# Patient Record
Sex: FEMALE | Race: WHITE | NOT HISPANIC OR LATINO | Employment: OTHER | URBAN - METROPOLITAN AREA
[De-identification: names, ages, dates, MRNs, and addresses within clinical notes are randomized per-mention and may not be internally consistent; named-entity substitution may affect disease eponyms.]

---

## 2023-04-06 ENCOUNTER — OFFICE VISIT (OUTPATIENT)
Dept: FAMILY MEDICINE CLINIC | Facility: CLINIC | Age: 38
End: 2023-04-06
Payer: COMMERCIAL

## 2023-04-06 VITALS
SYSTOLIC BLOOD PRESSURE: 102 MMHG | TEMPERATURE: 97.3 F | OXYGEN SATURATION: 100 % | DIASTOLIC BLOOD PRESSURE: 65 MMHG | HEIGHT: 65 IN | HEART RATE: 79 BPM | WEIGHT: 169.8 LBS | BODY MASS INDEX: 28.29 KG/M2

## 2023-04-06 DIAGNOSIS — E66.3 OVERWEIGHT (BMI 25.0-29.9): ICD-10-CM

## 2023-04-06 DIAGNOSIS — Z00.01 ENCOUNTER FOR GENERAL ADULT MEDICAL EXAMINATION WITH ABNORMAL FINDINGS: Primary | ICD-10-CM

## 2023-04-06 DIAGNOSIS — Z13.220 LIPID SCREENING: ICD-10-CM

## 2023-04-06 DIAGNOSIS — N92.0 MENORRHAGIA WITH REGULAR CYCLE: ICD-10-CM

## 2023-04-06 PROBLEM — Z00.00 ENCOUNTER FOR HEALTH MAINTENANCE EXAMINATION: Status: ACTIVE | Noted: 2020-12-08

## 2023-04-06 PROBLEM — L40.9 PSORIASIS: Status: ACTIVE | Noted: 2018-11-20

## 2023-04-06 RX ORDER — ASCORBIC ACID 250 MG
250 TABLET ORAL DAILY
COMMUNITY

## 2023-04-06 NOTE — PROGRESS NOTES
Subjective   Love Joya is a 37 y.o. female who presents for annual female wellness exam.  Chief Complaint   Patient presents with   • Establish Care   • Annual Exam     Patient is here today to establish care as a new patient.  She was previous with Madison Rodriguez with [facility].  Has not seen her for a while.     Has no new complaints today. Just wanted to get established and get check up. Hasn't had in years     Menstrual History: regular, heavy, had mirena removed a few months ago  Pregnancy History:   Sexual History: 1 male partner  Contraception: partners vasecomtomy  Hormone Replacement Therapy:n/a  Diet: making healthier choices. Drinks mostly water and coffee  Exercise:  Walking since nice weather.   Do you feel safe? Reports she does  Have you ever been abused? Denies  Dentist: twice yearly  Eye doctor: been a while    Mammogram: n/a  Pap Smear:   Bone Density: n/a  Colon Cancer Screening: n/a    Immunization History   Administered Date(s) Administered   • 31-influenza Vac Quardvalent Preservativ 11/20/2017   • Flu Vaccine Quad PF 6-35MO 10/16/2018   • FluLaval/Fluzone >6mos 11/10/2014, 10/24/2019, 10/07/2020   • Tdap 06/13/2013, 06/29/2018       The following portions of the patient's history were reviewed and updated as appropriate: allergies, current medications, past family history, past medical history, past social history, past surgical history and problem list.    History reviewed. No pertinent past medical history.    History reviewed. No pertinent surgical history.    Family History   Problem Relation Age of Onset   • Lung cancer Paternal Grandmother    • Hypothyroidism Paternal Grandmother    • Colon cancer Paternal Grandfather 64       Social History     Socioeconomic History   • Marital status:    Tobacco Use   • Smoking status: Never   Vaping Use   • Vaping Use: Never used   Substance and Sexual Activity   • Alcohol use: Yes     Comment: socially   • Drug use: Never   • Sexual activity:  Yes     Partners: Male     Birth control/protection: Vasectomy       Review of Systems    Objective   Vitals:    04/06/23 0802   BP: 102/65   Pulse: 79   Temp: 97.3 °F (36.3 °C)   SpO2: 100%     Body mass index is 28.26 kg/m².  Physical Exam  Constitutional:       Appearance: Normal appearance.   Cardiovascular:      Rate and Rhythm: Normal rate and regular rhythm.      Pulses: Normal pulses.   Pulmonary:      Effort: Pulmonary effort is normal.      Breath sounds: Normal breath sounds.   Skin:     General: Skin is warm and dry.   Neurological:      Mental Status: She is alert and oriented to person, place, and time.   Psychiatric:         Mood and Affect: Mood normal.         Behavior: Behavior normal.         Thought Content: Thought content normal.         Judgment: Judgment normal.           Assessment & Plan   Diagnoses and all orders for this visit:    1. Encounter for general adult medical examination with abnormal findings (Primary)    2. Lipid screening  -     Lipid Panel    3. Overweight (BMI 25.0-29.9)  -     CBC & Differential  -     Comprehensive Metabolic Panel  -     Lipid Panel  -     TSH    4. Menorrhagia with regular cycle  -     CBC & Differential  -     Vitamin B12        Physical complete for patient.  We discussed the need for weight loss to healthy BMI between 18 and 25.  This can be achieved with reduction of calories and carbs in diet and inclusion of exercise.  We will check labs today and call with results any changes needed plan of care.  Follow-up yearly and sooner as needed.  She verbalized understanding and is agreeable.         Discussed the importance of maintaining a healthy weight and getting regular exercise.  Educated patient on the benefits of healthy diet.  Advise follow-up annually for wellness exams.

## 2023-04-07 LAB
ALBUMIN SERPL-MCNC: 4.5 G/DL (ref 3.5–5.2)
ALBUMIN/GLOB SERPL: 2.3 G/DL
ALP SERPL-CCNC: 46 U/L (ref 39–117)
ALT SERPL-CCNC: 12 U/L (ref 1–33)
AST SERPL-CCNC: 15 U/L (ref 1–32)
BASOPHILS # BLD AUTO: 0.06 10*3/MM3 (ref 0–0.2)
BASOPHILS NFR BLD AUTO: 0.9 % (ref 0–1.5)
BILIRUB SERPL-MCNC: 0.3 MG/DL (ref 0–1.2)
BUN SERPL-MCNC: 17 MG/DL (ref 6–20)
BUN/CREAT SERPL: 23.3 (ref 7–25)
CALCIUM SERPL-MCNC: 9.5 MG/DL (ref 8.6–10.5)
CHLORIDE SERPL-SCNC: 104 MMOL/L (ref 98–107)
CHOLEST SERPL-MCNC: 187 MG/DL (ref 0–200)
CO2 SERPL-SCNC: 26.9 MMOL/L (ref 22–29)
CREAT SERPL-MCNC: 0.73 MG/DL (ref 0.57–1)
EGFRCR SERPLBLD CKD-EPI 2021: 109.5 ML/MIN/1.73
EOSINOPHIL # BLD AUTO: 0.17 10*3/MM3 (ref 0–0.4)
EOSINOPHIL NFR BLD AUTO: 2.4 % (ref 0.3–6.2)
ERYTHROCYTE [DISTWIDTH] IN BLOOD BY AUTOMATED COUNT: 11.5 % (ref 12.3–15.4)
GLOBULIN SER CALC-MCNC: 2 GM/DL
GLUCOSE SERPL-MCNC: 94 MG/DL (ref 65–99)
HCT VFR BLD AUTO: 33 % (ref 34–46.6)
HDLC SERPL-MCNC: 52 MG/DL (ref 40–60)
HGB BLD-MCNC: 10.9 G/DL (ref 12–15.9)
IMM GRANULOCYTES # BLD AUTO: 0.02 10*3/MM3 (ref 0–0.05)
IMM GRANULOCYTES NFR BLD AUTO: 0.3 % (ref 0–0.5)
LDLC SERPL CALC-MCNC: 122 MG/DL (ref 0–100)
LYMPHOCYTES # BLD AUTO: 2.26 10*3/MM3 (ref 0.7–3.1)
LYMPHOCYTES NFR BLD AUTO: 32.6 % (ref 19.6–45.3)
MCH RBC QN AUTO: 32.2 PG (ref 26.6–33)
MCHC RBC AUTO-ENTMCNC: 33 G/DL (ref 31.5–35.7)
MCV RBC AUTO: 97.6 FL (ref 79–97)
MONOCYTES # BLD AUTO: 0.38 10*3/MM3 (ref 0.1–0.9)
MONOCYTES NFR BLD AUTO: 5.5 % (ref 5–12)
NEUTROPHILS # BLD AUTO: 4.05 10*3/MM3 (ref 1.7–7)
NEUTROPHILS NFR BLD AUTO: 58.3 % (ref 42.7–76)
NRBC BLD AUTO-RTO: 0 /100 WBC (ref 0–0.2)
PLATELET # BLD AUTO: 267 10*3/MM3 (ref 140–450)
POTASSIUM SERPL-SCNC: 4.3 MMOL/L (ref 3.5–5.2)
PROT SERPL-MCNC: 6.5 G/DL (ref 6–8.5)
RBC # BLD AUTO: 3.38 10*6/MM3 (ref 3.77–5.28)
SODIUM SERPL-SCNC: 137 MMOL/L (ref 136–145)
TRIGL SERPL-MCNC: 72 MG/DL (ref 0–150)
TSH SERPL DL<=0.005 MIU/L-ACNC: 1.58 UIU/ML (ref 0.27–4.2)
VIT B12 SERPL-MCNC: 999 PG/ML (ref 211–946)
VLDLC SERPL CALC-MCNC: 13 MG/DL (ref 5–40)
WBC # BLD AUTO: 6.94 10*3/MM3 (ref 3.4–10.8)

## 2023-04-10 LAB
FERRITIN SERPL-MCNC: 121 NG/ML (ref 13–150)
IRON SATN MFR SERPL: 16 % (ref 20–50)
IRON SERPL-MCNC: 52 MCG/DL (ref 37–145)
TIBC SERPL-MCNC: 332 MCG/DL
UIBC SERPL-MCNC: 280 MCG/DL (ref 112–346)
WRITTEN AUTHORIZATION: NORMAL

## 2023-04-12 RX ORDER — FERROUS SULFATE TAB EC 324 MG (65 MG FE EQUIVALENT) 324 (65 FE) MG
324 TABLET DELAYED RESPONSE ORAL
Qty: 90 TABLET | Refills: 1 | Status: SHIPPED | OUTPATIENT
Start: 2023-04-12

## 2023-04-12 NOTE — PROGRESS NOTES
Please call patient with results of labs.  Patient is anemic and iron panel is slightly low.  I would recommend she start iron supplement.  I am going to send in.  LDL or bad cholesterol is slightly elevated.  She should decrease saturated fats, fried foods, and fast foods in diet.  All other labs are stable.  We should recheck CBC and iron profile in 3 months.

## 2023-11-28 ENCOUNTER — OFFICE VISIT (OUTPATIENT)
Dept: FAMILY MEDICINE CLINIC | Facility: CLINIC | Age: 38
End: 2023-11-28
Payer: COMMERCIAL

## 2023-11-28 VITALS
HEIGHT: 65 IN | BODY MASS INDEX: 28.82 KG/M2 | SYSTOLIC BLOOD PRESSURE: 126 MMHG | OXYGEN SATURATION: 98 % | HEART RATE: 98 BPM | WEIGHT: 173 LBS | DIASTOLIC BLOOD PRESSURE: 78 MMHG

## 2023-11-28 DIAGNOSIS — R05.9 COUGH, UNSPECIFIED TYPE: ICD-10-CM

## 2023-11-28 DIAGNOSIS — J10.1 INFLUENZA A: Primary | ICD-10-CM

## 2023-11-28 LAB
EXPIRATION DATE: ABNORMAL
EXPIRATION DATE: NORMAL
FLUAV AG NPH QL: POSITIVE
FLUBV AG NPH QL: NEGATIVE
INTERNAL CONTROL: ABNORMAL
INTERNAL CONTROL: NORMAL
Lab: ABNORMAL
Lab: NORMAL
SARS-COV-2 AG UPPER RESP QL IA.RAPID: NOT DETECTED

## 2023-11-28 PROCEDURE — 99213 OFFICE O/P EST LOW 20 MIN: CPT | Performed by: FAMILY MEDICINE

## 2023-11-28 PROCEDURE — 87426 SARSCOV CORONAVIRUS AG IA: CPT | Performed by: FAMILY MEDICINE

## 2023-11-28 RX ORDER — BENZONATATE 200 MG/1
200 CAPSULE ORAL 3 TIMES DAILY PRN
Qty: 30 CAPSULE | Refills: 0 | Status: SHIPPED | OUTPATIENT
Start: 2023-11-28

## 2023-11-28 RX ORDER — DEXTROMETHORPHAN HYDROBROMIDE AND PROMETHAZINE HYDROCHLORIDE 15; 6.25 MG/5ML; MG/5ML
5 SYRUP ORAL NIGHTLY PRN
Qty: 118 ML | Refills: 0 | Status: SHIPPED | OUTPATIENT
Start: 2023-11-28

## 2023-11-28 NOTE — PROGRESS NOTES
"Chief Complaint  Cough (Since Saturday ) and URI    Subjective        Love Joya presents to [facility]  History of Present Illness  Patient is here today to discuss some new symptoms she is having.  For the past 3 days the patient has had cough and sinus congestion.  Son had Strep 2 weeks ago but no other ill contacts.  No fever or chills.  The patient denies a history of pulmonary diseases.  She states that this morning she also felt chest pain with cough but that has improved.  Patient denies any difficulty breathing.      Objective   Vital Signs:  /78   Pulse 98   Ht 165.1 cm (65\")   Wt 78.5 kg (173 lb)   SpO2 98%   BMI 28.79 kg/m²   Estimated body mass index is 28.79 kg/m² as calculated from the following:    Height as of this encounter: 165.1 cm (65\").    Weight as of this encounter: 78.5 kg (173 lb).             Physical Exam  Vitals and nursing note reviewed.   Constitutional:       Appearance: Normal appearance. She is well-developed.   HENT:      Head: Normocephalic and atraumatic.      Right Ear: Tympanic membrane, ear canal and external ear normal.      Left Ear: Tympanic membrane, ear canal and external ear normal.      Nose: Rhinorrhea present.      Mouth/Throat:      Pharynx: Posterior oropharyngeal erythema present.   Eyes:      General: No scleral icterus.     Conjunctiva/sclera: Conjunctivae normal.   Cardiovascular:      Rate and Rhythm: Normal rate and regular rhythm.      Heart sounds: Normal heart sounds.   Pulmonary:      Effort: Pulmonary effort is normal.      Breath sounds: Normal breath sounds.   Musculoskeletal:      Cervical back: Normal range of motion and neck supple.   Lymphadenopathy:      Cervical: Cervical adenopathy present.   Skin:     General: Skin is warm and dry.      Findings: No rash.   Neurological:      Mental Status: She is alert and oriented to person, place, and time.   Psychiatric:         Behavior: Behavior normal.         " Thought Content: Thought content normal.         Judgment: Judgment normal.        Result Review :  The following data was reviewed by: Marleen Bahena DO on 11/28/2023:  Common labs          4/6/2023    08:43   Common Labs   Glucose 94    BUN 17    Creatinine 0.73    Sodium 137    Potassium 4.3    Chloride 104    Calcium 9.5    Total Protein 6.5    Albumin 4.5    Total Bilirubin 0.3    Alkaline Phosphatase 46    AST (SGOT) 15    ALT (SGPT) 12    WBC 6.94    Hemoglobin 10.9    Hematocrit 33.0    Platelets 267    Total Cholesterol 187    Triglycerides 72    HDL Cholesterol 52    LDL Cholesterol  122      Influenza A&B          11/28/2023    11:52   Common Labsle   Rapid Influenza A Ag Positive    Rapid Influenza B Ag Negative                     Assessment and Plan   Diagnoses and all orders for this visit:    1. Influenza A (Primary)  -     promethazine-dextromethorphan (PROMETHAZINE-DM) 6.25-15 MG/5ML syrup; Take 5 mL by mouth At Night As Needed for Cough.  Dispense: 118 mL; Refill: 0  -     benzonatate (TESSALON) 200 MG capsule; Take 1 capsule by mouth 3 (Three) Times a Day As Needed for Cough.  Dispense: 30 capsule; Refill: 0    2. Cough, unspecified type  -     POC Influenza A / B  -     POCT SARS-CoV-2 Antigen FLETCHER    Patient is here today for an acute upper respiratory infection due to influenza A.  COVID test is negative.  Patient was advised to drink plenty of fluids and rest.  Patient was given the above prescription medications to help with the symptoms.  Patient was advised to monitor symptoms closely and follow-up or seek more urgent medical care if worsening.  Also advised to continue wearing a mask until they are feeling better.          Follow Up   Return if symptoms worsen or fail to improve.  Patient was given instructions and counseling regarding her condition or for health maintenance advice. Please see specific information pulled into the AVS if appropriate.

## 2023-12-15 ENCOUNTER — OFFICE VISIT (OUTPATIENT)
Dept: FAMILY MEDICINE CLINIC | Facility: CLINIC | Age: 38
End: 2023-12-15
Payer: COMMERCIAL

## 2023-12-15 VITALS
OXYGEN SATURATION: 99 % | BODY MASS INDEX: 28.99 KG/M2 | HEIGHT: 65 IN | HEART RATE: 73 BPM | WEIGHT: 174 LBS | TEMPERATURE: 98.6 F | SYSTOLIC BLOOD PRESSURE: 106 MMHG | DIASTOLIC BLOOD PRESSURE: 68 MMHG

## 2023-12-15 DIAGNOSIS — J40 BRONCHITIS: Primary | ICD-10-CM

## 2023-12-15 PROCEDURE — 99213 OFFICE O/P EST LOW 20 MIN: CPT | Performed by: NURSE PRACTITIONER

## 2023-12-15 RX ORDER — PREDNISONE 20 MG/1
TABLET ORAL
Qty: 9 TABLET | Refills: 0 | Status: SHIPPED | OUTPATIENT
Start: 2023-12-15 | End: 2023-12-20

## 2023-12-15 NOTE — PROGRESS NOTES
"Chief Complaint  Cough (Follow up wants to make sure lungs are clear)    Subjective        Love Joya presents to [facility]  History of Present Illness    Cough and feels congestion, worse in the morning.  Had Flu right after thanksgiving.   She is worried because she feels congested and has had productive cough.     Objective   Vital Signs:  /68   Pulse 73   Temp 98.6 °F (37 °C)   Ht 165.1 cm (65\")   Wt 78.9 kg (174 lb)   SpO2 99%   BMI 28.96 kg/m²   Estimated body mass index is 28.96 kg/m² as calculated from the following:    Height as of this encounter: 165.1 cm (65\").    Weight as of this encounter: 78.9 kg (174 lb).             Physical Exam  Constitutional:       Appearance: Normal appearance.   Cardiovascular:      Rate and Rhythm: Normal rate and regular rhythm.      Pulses: Normal pulses.   Pulmonary:      Effort: Pulmonary effort is normal.      Breath sounds: Normal breath sounds.   Skin:     General: Skin is warm and dry.   Neurological:      Mental Status: She is alert.   Psychiatric:         Mood and Affect: Mood normal.         Behavior: Behavior normal.         Thought Content: Thought content normal.         Judgment: Judgment normal.        Result Review :                   Assessment and Plan   Diagnoses and all orders for this visit:    1. Bronchitis (Primary)    Other orders  -     predniSONE (DELTASONE) 20 MG tablet; Take 1 tablet by mouth 2 (Two) Times a Day for 3 days, THEN 1 tablet Daily for 3 days.  Dispense: 9 tablet; Refill: 0      Will treat for bronchitis. Increase water intake.  Start steroid.   Follow up as needed.        Follow Up   No follow-ups on file.  Patient was given instructions and counseling regarding her condition or for health maintenance advice. Please see specific information pulled into the AVS if appropriate.         "

## 2023-12-27 RX ORDER — CEFDINIR 300 MG/1
300 CAPSULE ORAL 2 TIMES DAILY
Qty: 14 CAPSULE | Refills: 0 | Status: SHIPPED | OUTPATIENT
Start: 2023-12-27

## 2024-03-25 ENCOUNTER — OFFICE VISIT (OUTPATIENT)
Dept: FAMILY MEDICINE CLINIC | Facility: CLINIC | Age: 39
End: 2024-03-25
Payer: COMMERCIAL

## 2024-03-25 VITALS
BODY MASS INDEX: 28.69 KG/M2 | SYSTOLIC BLOOD PRESSURE: 104 MMHG | OXYGEN SATURATION: 99 % | HEART RATE: 76 BPM | TEMPERATURE: 98 F | WEIGHT: 172.2 LBS | DIASTOLIC BLOOD PRESSURE: 60 MMHG | HEIGHT: 65 IN

## 2024-03-25 DIAGNOSIS — L23.7 POISON SUMAC: Primary | ICD-10-CM

## 2024-03-25 RX ORDER — PREDNISONE 20 MG/1
TABLET ORAL
Qty: 15 TABLET | Refills: 0 | Status: SHIPPED | OUTPATIENT
Start: 2024-03-25 | End: 2024-04-04

## 2024-03-25 RX ORDER — TRIAMCINOLONE ACETONIDE 40 MG/ML
40 INJECTION, SUSPENSION INTRA-ARTICULAR; INTRAMUSCULAR ONCE
Status: COMPLETED | OUTPATIENT
Start: 2024-03-25 | End: 2024-03-25

## 2024-03-25 RX ADMIN — TRIAMCINOLONE ACETONIDE 40 MG: 40 INJECTION, SUSPENSION INTRA-ARTICULAR; INTRAMUSCULAR at 14:44

## 2024-03-27 NOTE — PROGRESS NOTES
"Chief Complaint  Poison [place]    Subjective        Love Joya presents to [facility]  History of Present Illness    Patient presents today with report of camping last weekend and exposure to poison sumac.  She has a rash on neck and behind neck and extending towards ear.    Objective   Vital Signs:  /60   Pulse 76   Temp 98 °F (36.7 °C)   Ht 165.1 cm (65\")   Wt 78.1 kg (172 lb 3.2 oz)   SpO2 99%   BMI 28.66 kg/m²   Estimated body mass index is 28.66 kg/m² as calculated from the following:    Height as of this encounter: 165.1 cm (65\").    Weight as of this encounter: 78.1 kg (172 lb 3.2 oz).             Physical Exam  Constitutional:       Appearance: Normal appearance.   Cardiovascular:      Rate and Rhythm: Normal rate and regular rhythm.      Pulses: Normal pulses.   Pulmonary:      Effort: Pulmonary effort is normal.      Breath sounds: Normal breath sounds.   Skin:     General: Skin is warm and dry.      Findings: Rash (Blistering noted.  Red and lacy.) present.   Neurological:      Mental Status: She is alert.   Psychiatric:         Mood and Affect: Mood normal.         Behavior: Behavior normal.         Thought Content: Thought content normal.         Judgment: Judgment normal.        Result Review :                     Assessment and Plan     Diagnoses and all orders for this visit:    1. Poison sumac (Primary)  -     triamcinolone acetonide (KENALOG-40) injection 40 mg    Other orders  -     predniSONE (DELTASONE) 20 MG tablet; Take 1 tablet by mouth 2 (Two) Times a Day for 5 days, THEN 1 tablet Daily for 5 days.  Dispense: 15 tablet; Refill: 0      Based on getting near face we will proceed with steroid injection.  Start oral steroid.  If no resolution follow-up as needed.  She verbalized understanding and is agreeable.       Follow Up     No follow-ups on file.  Patient was given instructions and counseling regarding her condition or for health maintenance " advice. Please see specific information pulled into the AVS if appropriate.

## 2024-04-10 ENCOUNTER — OFFICE VISIT (OUTPATIENT)
Dept: FAMILY MEDICINE CLINIC | Facility: CLINIC | Age: 39
End: 2024-04-10
Payer: COMMERCIAL

## 2024-04-10 VITALS
BODY MASS INDEX: 28.36 KG/M2 | TEMPERATURE: 97.3 F | WEIGHT: 170.2 LBS | OXYGEN SATURATION: 98 % | SYSTOLIC BLOOD PRESSURE: 102 MMHG | DIASTOLIC BLOOD PRESSURE: 60 MMHG | HEIGHT: 65 IN | HEART RATE: 70 BPM

## 2024-04-10 DIAGNOSIS — Z13.220 LIPID SCREENING: ICD-10-CM

## 2024-04-10 DIAGNOSIS — E66.3 OVERWEIGHT (BMI 25.0-29.9): ICD-10-CM

## 2024-04-10 DIAGNOSIS — Z00.00 ENCOUNTER FOR HEALTH MAINTENANCE EXAMINATION: Primary | ICD-10-CM

## 2024-04-10 NOTE — PROGRESS NOTES
Subjective   Love Joya is a 38 y.o. female who presents for annual female wellness exam.  Chief Complaint   Patient presents with    Annual Exam     No pap     Hyperlipidemia        Patient is here today for complete physical.        Menstrual History: regular  Pregnancy History: 4, 3 living  Sexual History: 1 male partner   Contraception: partners vasectomy  Hormone Replacement Therapy: n/a  Diet: Just started on carnivore diet.  Drinks mostly water  Exercise: walks daily  Do you feel safe? Reports she does  Have you ever been abused? Denies  Dentist: twice yearly  Eye doctor: 1.5 years ago    Mammogram: n/a  Pap Smear: 2 years ago  Bone Density: n/a  Colon Cancer Screening: n/a    Immunization History   Administered Date(s) Administered    31-influenza Vac Quardvalent Preservativ 11/20/2017    Flu Vaccine Quad PF 6-35MO 10/16/2018    Fluzone (or Fluarix & Flulaval for VFC) >6mos 11/10/2014, 10/24/2019, 10/07/2020    Tdap 06/13/2013, 06/29/2018       The following portions of the patient's history were reviewed and updated as appropriate: allergies, current medications, past family history, past medical history, past social history, past surgical history and problem list.    History reviewed. No pertinent past medical history.    History reviewed. No pertinent surgical history.    Family History   Problem Relation Age of Onset    Lung cancer Paternal Grandmother     Hypothyroidism Paternal Grandmother     Colon cancer Paternal Grandfather 64       Social History     Socioeconomic History    Marital status:    Tobacco Use    Smoking status: Never     Passive exposure: Never    Smokeless tobacco: Never   Vaping Use    Vaping status: Never Used   Substance and Sexual Activity    Alcohol use: Yes     Comment: socially    Drug use: Never    Sexual activity: Yes     Partners: Male     Birth control/protection: Vasectomy       Review of Systems    Objective   Vitals:    04/10/24 0850   BP: 102/60   Pulse: 70    Temp: 97.3 °F (36.3 °C)   SpO2: 98%     Body mass index is 28.32 kg/m².  Physical Exam  Constitutional:       Appearance: Normal appearance.   Cardiovascular:      Rate and Rhythm: Normal rate and regular rhythm.      Pulses: Normal pulses.   Pulmonary:      Effort: Pulmonary effort is normal.      Breath sounds: Normal breath sounds.   Skin:     General: Skin is warm and dry.   Neurological:      Mental Status: She is alert.   Psychiatric:         Mood and Affect: Mood normal.         Behavior: Behavior normal.         Thought Content: Thought content normal.         Judgment: Judgment normal.           Assessment & Plan   Diagnoses and all orders for this visit:    1. Encounter for health maintenance examination (Primary)  -     CBC & Differential  -     Comprehensive Metabolic Panel  -     Lipid Panel  -     TSH    2. Lipid screening  -     CBC & Differential  -     Comprehensive Metabolic Panel  -     Lipid Panel  -     TSH    3. Overweight (BMI 25.0-29.9)  -     CBC & Differential  -     Comprehensive Metabolic Panel  -     Lipid Panel  -     TSH      Healthy adult physical     BMI is >= 25 and <30. (Overweight) The following options were offered after discussion;: exercise counseling/recommendations and nutrition counseling/recommendations    Discussed pap showed due.     Will check labs and call with results and any changes needed to plan of care.      Follow up yearly and sooner as needed.            Discussed the importance of maintaining a healthy weight and getting regular exercise.  Educated patient on the benefits of healthy diet.  Advise follow-up annually for wellness exams.

## 2024-04-11 LAB
ALBUMIN SERPL-MCNC: 4.4 G/DL (ref 3.5–5.2)
ALBUMIN/GLOB SERPL: 1.9 G/DL
ALP SERPL-CCNC: 49 U/L (ref 39–117)
ALT SERPL-CCNC: 13 U/L (ref 1–33)
AST SERPL-CCNC: 12 U/L (ref 1–32)
BASOPHILS # BLD AUTO: 0.07 10*3/MM3 (ref 0–0.2)
BASOPHILS NFR BLD AUTO: 0.8 % (ref 0–1.5)
BILIRUB SERPL-MCNC: 0.5 MG/DL (ref 0–1.2)
BUN SERPL-MCNC: 21 MG/DL (ref 6–20)
BUN/CREAT SERPL: 27.3 (ref 7–25)
CALCIUM SERPL-MCNC: 9.4 MG/DL (ref 8.6–10.5)
CHLORIDE SERPL-SCNC: 102 MMOL/L (ref 98–107)
CHOLEST SERPL-MCNC: 201 MG/DL (ref 0–200)
CO2 SERPL-SCNC: 23.8 MMOL/L (ref 22–29)
CREAT SERPL-MCNC: 0.77 MG/DL (ref 0.57–1)
EGFRCR SERPLBLD CKD-EPI 2021: 102 ML/MIN/1.73
EOSINOPHIL # BLD AUTO: 0.2 10*3/MM3 (ref 0–0.4)
EOSINOPHIL NFR BLD AUTO: 2.3 % (ref 0.3–6.2)
ERYTHROCYTE [DISTWIDTH] IN BLOOD BY AUTOMATED COUNT: 11.5 % (ref 12.3–15.4)
GLOBULIN SER CALC-MCNC: 2.3 GM/DL
GLUCOSE SERPL-MCNC: 84 MG/DL (ref 65–99)
HCT VFR BLD AUTO: 35.1 % (ref 34–46.6)
HDLC SERPL-MCNC: 54 MG/DL (ref 40–60)
HGB BLD-MCNC: 11.7 G/DL (ref 12–15.9)
IMM GRANULOCYTES # BLD AUTO: 0.02 10*3/MM3 (ref 0–0.05)
IMM GRANULOCYTES NFR BLD AUTO: 0.2 % (ref 0–0.5)
LDLC SERPL CALC-MCNC: 133 MG/DL (ref 0–100)
LYMPHOCYTES # BLD AUTO: 2.46 10*3/MM3 (ref 0.7–3.1)
LYMPHOCYTES NFR BLD AUTO: 28.1 % (ref 19.6–45.3)
MCH RBC QN AUTO: 31.5 PG (ref 26.6–33)
MCHC RBC AUTO-ENTMCNC: 33.3 G/DL (ref 31.5–35.7)
MCV RBC AUTO: 94.6 FL (ref 79–97)
MONOCYTES # BLD AUTO: 0.54 10*3/MM3 (ref 0.1–0.9)
MONOCYTES NFR BLD AUTO: 6.2 % (ref 5–12)
NEUTROPHILS # BLD AUTO: 5.48 10*3/MM3 (ref 1.7–7)
NEUTROPHILS NFR BLD AUTO: 62.4 % (ref 42.7–76)
NRBC BLD AUTO-RTO: 0 /100 WBC (ref 0–0.2)
PLATELET # BLD AUTO: 281 10*3/MM3 (ref 140–450)
POTASSIUM SERPL-SCNC: 4.5 MMOL/L (ref 3.5–5.2)
PROT SERPL-MCNC: 6.7 G/DL (ref 6–8.5)
RBC # BLD AUTO: 3.71 10*6/MM3 (ref 3.77–5.28)
SODIUM SERPL-SCNC: 136 MMOL/L (ref 136–145)
TRIGL SERPL-MCNC: 78 MG/DL (ref 0–150)
TSH SERPL DL<=0.005 MIU/L-ACNC: 1.55 UIU/ML (ref 0.27–4.2)
VLDLC SERPL CALC-MCNC: 14 MG/DL (ref 5–40)
WBC # BLD AUTO: 8.77 10*3/MM3 (ref 3.4–10.8)

## 2024-04-11 NOTE — PROGRESS NOTES
Please call patient and let her know that she is still slightly anemic but improved from last check 1 year ago.  Liver function is within normal limits.  Cholesterol is slightly worsened but is stable.  Total cholesterol 201 and .  Continue to work hard on decreasing saturated fats, fried foods, and fast foods in diet.  We should recheck in 1 year for stability.

## 2024-07-18 ENCOUNTER — OFFICE VISIT (OUTPATIENT)
Dept: FAMILY MEDICINE CLINIC | Facility: CLINIC | Age: 39
End: 2024-07-18
Payer: COMMERCIAL

## 2024-07-18 VITALS
BODY MASS INDEX: 26.86 KG/M2 | HEART RATE: 68 BPM | DIASTOLIC BLOOD PRESSURE: 62 MMHG | TEMPERATURE: 97.7 F | SYSTOLIC BLOOD PRESSURE: 102 MMHG | OXYGEN SATURATION: 98 % | WEIGHT: 161.2 LBS | HEIGHT: 65 IN

## 2024-07-18 DIAGNOSIS — S39.012A STRAIN OF LUMBAR REGION, INITIAL ENCOUNTER: Primary | ICD-10-CM

## 2024-07-18 PROCEDURE — 99213 OFFICE O/P EST LOW 20 MIN: CPT | Performed by: NURSE PRACTITIONER

## 2024-07-18 RX ORDER — CYCLOBENZAPRINE HCL 10 MG
10 TABLET ORAL 3 TIMES DAILY PRN
Qty: 30 TABLET | Refills: 0 | Status: SHIPPED | OUTPATIENT
Start: 2024-07-18

## 2024-07-18 RX ORDER — METHYLPREDNISOLONE 4 MG/1
TABLET ORAL
Qty: 21 TABLET | Refills: 0 | Status: SHIPPED | OUTPATIENT
Start: 2024-07-18

## 2024-07-18 NOTE — PROGRESS NOTES
"Chief Complaint  Back Pain (Pt states she pulled a muscle in her back Monday lifting a gate )    Subjective        Love Joya presents to [facility]  History of Present Illness    Patient is here today with complaint of back pain since pulling a gate up on Monday.  Is a dull achy pain that is in lower back and shoots into hips with certain movements.     Has old muscle relaxers    Objective   Vital Signs:  /62   Pulse 68   Temp 97.7 °F (36.5 °C)   Ht 165.1 cm (65\")   Wt 73.1 kg (161 lb 3.2 oz)   SpO2 98%   BMI 26.83 kg/m²   Estimated body mass index is 26.83 kg/m² as calculated from the following:    Height as of this encounter: 165.1 cm (65\").    Weight as of this encounter: 73.1 kg (161 lb 3.2 oz).               Physical Exam  Constitutional:       Appearance: Normal appearance.   Cardiovascular:      Rate and Rhythm: Normal rate and regular rhythm.      Pulses: Normal pulses.   Pulmonary:      Effort: Pulmonary effort is normal.      Breath sounds: Normal breath sounds.   Skin:     General: Skin is warm and dry.   Neurological:      Mental Status: She is alert.   Psychiatric:         Mood and Affect: Mood normal.         Behavior: Behavior normal.         Thought Content: Thought content normal.         Judgment: Judgment normal.        Result Review :                     Assessment and Plan     Diagnoses and all orders for this visit:    1. Strain of lumbar region, initial encounter (Primary)    Other orders  -     methylPREDNISolone (MEDROL) 4 MG dose pack; Take as directed on package instructions.  Dispense: 21 tablet; Refill: 0  -     cyclobenzaprine (FLEXERIL) 10 MG tablet; Take 1 tablet by mouth 3 (Three) Times a Day As Needed for Muscle Spasms.  Dispense: 30 tablet; Refill: 0    Patient did not want to proceed with x-rays when offered.  Will proceed with steroid and muscle relaxer.  If no resolution she will let me know and I will get x-ray ordered as a neck " step.  We also discussed possible physical therapy.  She verbalized understanding and is agreeable.         Follow Up     No follow-ups on file.  Patient was given instructions and counseling regarding her condition or for health maintenance advice. Please see specific information pulled into the AVS if appropriate.

## 2024-12-04 ENCOUNTER — OFFICE VISIT (OUTPATIENT)
Dept: FAMILY MEDICINE CLINIC | Facility: CLINIC | Age: 39
End: 2024-12-04
Payer: COMMERCIAL

## 2024-12-04 VITALS
HEART RATE: 76 BPM | HEIGHT: 65 IN | TEMPERATURE: 98 F | SYSTOLIC BLOOD PRESSURE: 114 MMHG | OXYGEN SATURATION: 100 % | WEIGHT: 158.2 LBS | DIASTOLIC BLOOD PRESSURE: 70 MMHG | BODY MASS INDEX: 26.36 KG/M2

## 2024-12-04 DIAGNOSIS — F41.9 ANXIETY: Primary | ICD-10-CM

## 2024-12-04 PROCEDURE — 99213 OFFICE O/P EST LOW 20 MIN: CPT | Performed by: NURSE PRACTITIONER

## 2024-12-04 RX ORDER — OMEGA-3S/DHA/EPA/FISH OIL/D3 300MG-1000
400 CAPSULE ORAL DAILY
COMMUNITY

## 2024-12-04 RX ORDER — BUSPIRONE HYDROCHLORIDE 10 MG/1
10 TABLET ORAL 2 TIMES DAILY
Qty: 60 TABLET | Refills: 2 | Status: SHIPPED | OUTPATIENT
Start: 2024-12-04

## 2024-12-04 NOTE — PROGRESS NOTES
"Chief Complaint  Anxiety    Subjective        Love Joya presents to [facility]  History of Present Illness    Patient presents today to discuss anxiety.  He has been experiencing more frequent panic attacks. She feels like related to ovulation.  States for last 2 months has been worse and feels like heart pounding.      Previous trials of medication include lexapro.  States that didn't work.     Denies any issues with depression.        Objective   Vital Signs:  /70   Pulse 76   Temp 98 °F (36.7 °C)   Ht 165.1 cm (65\")   Wt 71.8 kg (158 lb 3.2 oz)   SpO2 100%   BMI 26.33 kg/m²   Estimated body mass index is 26.33 kg/m² as calculated from the following:    Height as of this encounter: 165.1 cm (65\").    Weight as of this encounter: 71.8 kg (158 lb 3.2 oz).            Physical Exam  Constitutional:       Appearance: Normal appearance.   Cardiovascular:      Rate and Rhythm: Normal rate and regular rhythm.      Pulses: Normal pulses.   Pulmonary:      Effort: Pulmonary effort is normal.      Breath sounds: Normal breath sounds.   Skin:     General: Skin is warm and dry.   Neurological:      Mental Status: She is alert.   Psychiatric:         Mood and Affect: Mood normal.         Behavior: Behavior normal.         Thought Content: Thought content normal.         Judgment: Judgment normal.        Result Review :                Assessment and Plan   Diagnoses and all orders for this visit:    1. Anxiety (Primary)    Other orders  -     busPIRone (BUSPAR) 10 MG tablet; Take 1 tablet by mouth 2 (Two) Times a Day.  Dispense: 60 tablet; Refill: 2    We discussed options for treatment and patient would like to trial BuSpar.  Discussed possible side effects.  If any worsening mood or side effects notify provider.  If any suicidal homicidal ideations go to the ER.  Will follow-up in 4 to 6 weeks for medication management.  She verbalized understanding and is agreeable.         Follow " Up   Return in about 6 weeks (around 1/15/2025).  Patient was given instructions and counseling regarding her condition or for health maintenance advice. Please see specific information pulled into the AVS if appropriate.

## 2024-12-16 ENCOUNTER — OFFICE VISIT (OUTPATIENT)
Dept: FAMILY MEDICINE CLINIC | Facility: CLINIC | Age: 39
End: 2024-12-16
Payer: COMMERCIAL

## 2024-12-16 VITALS
BODY MASS INDEX: 26.46 KG/M2 | HEART RATE: 82 BPM | WEIGHT: 158.8 LBS | SYSTOLIC BLOOD PRESSURE: 122 MMHG | OXYGEN SATURATION: 97 % | HEIGHT: 65 IN | DIASTOLIC BLOOD PRESSURE: 62 MMHG

## 2024-12-16 DIAGNOSIS — F41.9 ANXIETY: Primary | ICD-10-CM

## 2024-12-16 DIAGNOSIS — F41.9 ANXIETY: ICD-10-CM

## 2024-12-16 DIAGNOSIS — J10.1 INFLUENZA A: Primary | ICD-10-CM

## 2024-12-16 PROCEDURE — 93000 ELECTROCARDIOGRAM COMPLETE: CPT | Performed by: NURSE PRACTITIONER

## 2024-12-16 PROCEDURE — 99213 OFFICE O/P EST LOW 20 MIN: CPT | Performed by: NURSE PRACTITIONER

## 2024-12-16 RX ORDER — HYDROXYZINE HYDROCHLORIDE 25 MG/1
25 TABLET, FILM COATED ORAL 3 TIMES DAILY PRN
Qty: 60 TABLET | Refills: 2 | Status: SHIPPED | OUTPATIENT
Start: 2024-12-16

## 2024-12-16 RX ORDER — ONDANSETRON 4 MG/1
4 TABLET, ORALLY DISINTEGRATING ORAL EVERY 8 HOURS PRN
Qty: 30 TABLET | Refills: 0 | Status: SHIPPED | OUTPATIENT
Start: 2024-12-16

## 2024-12-16 RX ORDER — CETIRIZINE HYDROCHLORIDE, PSEUDOEPHEDRINE HYDROCHLORIDE 5; 120 MG/1; MG/1
1 TABLET, FILM COATED, EXTENDED RELEASE ORAL 2 TIMES DAILY
Qty: 60 TABLET | Refills: 2 | Status: SHIPPED | OUTPATIENT
Start: 2024-12-16

## 2024-12-17 LAB
25(OH)D3+25(OH)D2 SERPL-MCNC: 27.3 NG/ML (ref 30–100)
ALBUMIN SERPL-MCNC: 4.6 G/DL (ref 3.9–4.9)
ALP SERPL-CCNC: 50 IU/L (ref 44–121)
ALT SERPL-CCNC: 11 IU/L (ref 0–32)
AST SERPL-CCNC: 16 IU/L (ref 0–40)
BASOPHILS # BLD AUTO: 0.1 X10E3/UL (ref 0–0.2)
BASOPHILS NFR BLD AUTO: 1 %
BILIRUB SERPL-MCNC: 0.4 MG/DL (ref 0–1.2)
BUN SERPL-MCNC: 9 MG/DL (ref 6–20)
BUN/CREAT SERPL: 13 (ref 9–23)
CALCIUM SERPL-MCNC: 9.5 MG/DL (ref 8.7–10.2)
CHLORIDE SERPL-SCNC: 105 MMOL/L (ref 96–106)
CO2 SERPL-SCNC: 20 MMOL/L (ref 20–29)
CREAT SERPL-MCNC: 0.68 MG/DL (ref 0.57–1)
EGFRCR SERPLBLD CKD-EPI 2021: 115 ML/MIN/1.73
EOSINOPHIL # BLD AUTO: 0.1 X10E3/UL (ref 0–0.4)
EOSINOPHIL NFR BLD AUTO: 1 %
ERYTHROCYTE [DISTWIDTH] IN BLOOD BY AUTOMATED COUNT: 11.5 % (ref 11.7–15.4)
GLOBULIN SER CALC-MCNC: 2.3 G/DL (ref 1.5–4.5)
GLUCOSE SERPL-MCNC: 85 MG/DL (ref 70–99)
HCT VFR BLD AUTO: 35.6 % (ref 34–46.6)
HGB BLD-MCNC: 11.9 G/DL (ref 11.1–15.9)
IMM GRANULOCYTES # BLD AUTO: 0 X10E3/UL (ref 0–0.1)
IMM GRANULOCYTES NFR BLD AUTO: 0 %
LYMPHOCYTES # BLD AUTO: 1.9 X10E3/UL (ref 0.7–3.1)
LYMPHOCYTES NFR BLD AUTO: 19 %
MCH RBC QN AUTO: 32.1 PG (ref 26.6–33)
MCHC RBC AUTO-ENTMCNC: 33.4 G/DL (ref 31.5–35.7)
MCV RBC AUTO: 96 FL (ref 79–97)
MONOCYTES # BLD AUTO: 0.5 X10E3/UL (ref 0.1–0.9)
MONOCYTES NFR BLD AUTO: 4 %
NEUTROPHILS # BLD AUTO: 7.9 X10E3/UL (ref 1.4–7)
NEUTROPHILS NFR BLD AUTO: 75 %
PLATELET # BLD AUTO: 323 X10E3/UL (ref 150–450)
POTASSIUM SERPL-SCNC: 4 MMOL/L (ref 3.5–5.2)
PROT SERPL-MCNC: 6.9 G/DL (ref 6–8.5)
RBC # BLD AUTO: 3.71 X10E6/UL (ref 3.77–5.28)
SODIUM SERPL-SCNC: 140 MMOL/L (ref 134–144)
TSH SERPL DL<=0.005 MIU/L-ACNC: 1.03 UIU/ML (ref 0.45–4.5)
VIT B12 SERPL-MCNC: 483 PG/ML (ref 232–1245)
WBC # BLD AUTO: 10.5 X10E3/UL (ref 3.4–10.8)

## 2024-12-18 RX ORDER — ERGOCALCIFEROL 1.25 MG/1
50000 CAPSULE, LIQUID FILLED ORAL WEEKLY
Qty: 12 CAPSULE | Refills: 3 | Status: SHIPPED | OUTPATIENT
Start: 2024-12-18

## 2024-12-18 NOTE — PROGRESS NOTES
"Chief Complaint  Anxiety, Diarrhea, Nausea, and Chills (Started this morning woke up anxious and very nauseous with diarrhea. )    Subjective        Love Joya presents to [facility]  History of Present Illness    Patient presents today with complaint of anxiety worsening this morning.  She is also having nausea, diarrhea, and chills that started today as well.    Denies fever, ear pain pressure, sore throat    OTC nothing    Objective   Vital Signs:  /62   Pulse 82   Ht 165.1 cm (65\")   Wt 72 kg (158 lb 12.8 oz)   SpO2 97%   BMI 26.43 kg/m²   Estimated body mass index is 26.43 kg/m² as calculated from the following:    Height as of this encounter: 165.1 cm (65\").    Weight as of this encounter: 72 kg (158 lb 12.8 oz).            Physical Exam  Constitutional:       Appearance: Normal appearance.   HENT:      Head: Normocephalic and atraumatic.      Right Ear: Tympanic membrane has decreased mobility.      Left Ear: Tympanic membrane has decreased mobility.      Nose: Rhinorrhea present.      Right Sinus: No maxillary sinus tenderness or frontal sinus tenderness.      Left Sinus: No maxillary sinus tenderness or frontal sinus tenderness.      Mouth/Throat:      Pharynx: Postnasal drip present.   Cardiovascular:      Rate and Rhythm: Normal rate and regular rhythm.      Pulses: Normal pulses.   Pulmonary:      Effort: Pulmonary effort is normal.      Breath sounds: Normal breath sounds.   Skin:     General: Skin is warm and dry.   Neurological:      Mental Status: She is alert.   Psychiatric:         Mood and Affect: Mood normal.         Behavior: Behavior normal.         Thought Content: Thought content normal.         Judgment: Judgment normal.      Result Review :           ECG 12 Lead    Date/Time: 12/16/2024 3:15 PM  Performed by: Larry Garza APRN    Authorized by: Larry Garza APRN  Comparison: not compared with previous ECG   Previous ECG: no previous ECG " available  Rhythm: sinus rhythm    Clinical impression: normal ECG         Influenza A is positive  Influenza B is negative  COVID is negative     Assessment and Plan   Diagnoses and all orders for this visit:    1. Influenza A (Primary)    Other orders  -     ondansetron ODT (ZOFRAN-ODT) 4 MG disintegrating tablet; Place 1 tablet on the tongue Every 8 (Eight) Hours As Needed for Nausea or Vomiting.  Dispense: 30 tablet; Refill: 0  -     cetirizine-pseudoephedrine (ZyrTEC-D) 5-120 MG per 12 hr tablet; Take 1 tablet by mouth 2 (Two) Times a Day.  Dispense: 60 tablet; Refill: 2  -     hydrOXYzine (ATARAX) 25 MG tablet; Take 1 tablet by mouth 3 (Three) Times a Day As Needed for Anxiety.  Dispense: 60 tablet; Refill: 2    Treating for influenza A.  Start Zyrtec-D and Zofran as needed.  Will give hydroxyzine for as needed use.  If any continued issues notify provider.         Follow Up   No follow-ups on file.  Patient was given instructions and counseling regarding her condition or for health maintenance advice. Please see specific information pulled into the AVS if appropriate.

## 2024-12-18 NOTE — PROGRESS NOTES
Please call patient with results of labs.  Her vitamin D is slightly low.  Would recommend starting vitamin D 2000 IUs daily.  Vitamin B-12 and thyroid and all other labs are stable.

## 2024-12-21 ENCOUNTER — DOCUMENTATION (OUTPATIENT)
Dept: FAMILY MEDICINE CLINIC | Facility: CLINIC | Age: 39
End: 2024-12-21
Payer: COMMERCIAL

## 2024-12-21 RX ORDER — DESVENLAFAXINE 25 MG/1
25 TABLET, EXTENDED RELEASE ORAL DAILY
Qty: 30 TABLET | Refills: 2 | Status: SHIPPED | OUTPATIENT
Start: 2024-12-21

## 2024-12-26 ENCOUNTER — PATIENT MESSAGE (OUTPATIENT)
Dept: FAMILY MEDICINE CLINIC | Facility: CLINIC | Age: 39
End: 2024-12-26
Payer: COMMERCIAL

## 2024-12-26 DIAGNOSIS — R11.0 NAUSEA: Primary | ICD-10-CM

## 2025-01-08 ENCOUNTER — TELEPHONE (OUTPATIENT)
Dept: FAMILY MEDICINE CLINIC | Facility: CLINIC | Age: 40
End: 2025-01-08

## 2025-01-08 NOTE — TELEPHONE ENCOUNTER
Caller: Love Joya    Relationship: Self    Best call back number: [phone]     What is the best time to reach you: ANYTIME    What was the call regarding: PATIENT STATES THAT SHE WAS BEING REFERRED FOR A GALLBALDDER ULTRASOUND TO [facility] IN [place] BUT THEY HAVE NOT GOTTEN THE ORDER YET.     PATIENT WOULD LIKE TO SPEAK WITH SOMEONE ABOUT THIS.

## 2025-01-13 ENCOUNTER — TELEPHONE (OUTPATIENT)
Dept: FAMILY MEDICINE CLINIC | Facility: CLINIC | Age: 40
End: 2025-01-13
Payer: COMMERCIAL

## 2025-01-13 NOTE — TELEPHONE ENCOUNTER
Homero Manley,  This patient hasn't heard back for scheduling her US.  Can you please check into that for her and let her know when this is set up for.

## 2025-01-16 DIAGNOSIS — R11.0 NAUSEA: ICD-10-CM

## 2025-01-16 NOTE — PROGRESS NOTES
Discussed negative results with patient.  She does not want to proceed with any further workup or medication.  She is can work on diet.  She will let me know if any symptoms persist or worsen.

## 2025-04-11 ENCOUNTER — OFFICE VISIT (OUTPATIENT)
Dept: FAMILY MEDICINE CLINIC | Facility: CLINIC | Age: 40
End: 2025-04-11
Payer: COMMERCIAL

## 2025-04-11 VITALS
DIASTOLIC BLOOD PRESSURE: 64 MMHG | BODY MASS INDEX: 26.49 KG/M2 | WEIGHT: 159 LBS | HEIGHT: 65 IN | TEMPERATURE: 98 F | OXYGEN SATURATION: 100 % | SYSTOLIC BLOOD PRESSURE: 102 MMHG | HEART RATE: 67 BPM

## 2025-04-11 DIAGNOSIS — J01.00 ACUTE MAXILLARY SINUSITIS, RECURRENCE NOT SPECIFIED: Primary | ICD-10-CM

## 2025-04-11 PROCEDURE — 99213 OFFICE O/P EST LOW 20 MIN: CPT | Performed by: NURSE PRACTITIONER

## 2025-04-11 RX ORDER — LEVOCETIRIZINE DIHYDROCHLORIDE 5 MG/1
5 TABLET, FILM COATED ORAL EVERY EVENING
COMMUNITY

## 2025-04-11 RX ORDER — MULTIVIT WITH MINERALS/LUTEIN
250 TABLET ORAL DAILY
COMMUNITY

## 2025-04-11 RX ORDER — IPRATROPIUM BROMIDE 21 UG/1
2 SPRAY, METERED NASAL EVERY 12 HOURS
Qty: 30 ML | Refills: 2 | Status: SHIPPED | OUTPATIENT
Start: 2025-04-11

## 2025-04-11 NOTE — PROGRESS NOTES
"Chief Complaint  Earache (Both ears for past couple days) and Dizziness    Subjective        Love Joya presents to [facility]  History of Present Illness      Patient presents today with complaint of ear pressure, sinus pressure, headaches, dizziness for last 2 days.       Started xyzal    Objective   Vital Signs:  /64   Pulse 67   Temp 98 °F (36.7 °C)   Ht 165.1 cm (65\")   Wt 72.1 kg (159 lb)   SpO2 100%   BMI 26.46 kg/m²   Estimated body mass index is 26.46 kg/m² as calculated from the following:    Height as of this encounter: 165.1 cm (65\").    Weight as of this encounter: 72.1 kg (159 lb).          Physical Exam  Constitutional:       Appearance: Normal appearance.   HENT:      Head: Normocephalic and atraumatic.      Right Ear: Tympanic membrane has decreased mobility.      Left Ear: Tympanic membrane has decreased mobility.      Nose: Rhinorrhea present.      Right Sinus: Maxillary sinus tenderness present.      Left Sinus: Maxillary sinus tenderness present.   Cardiovascular:      Rate and Rhythm: Normal rate and regular rhythm.      Pulses: Normal pulses.   Pulmonary:      Effort: Pulmonary effort is normal.      Breath sounds: Normal breath sounds.   Skin:     General: Skin is warm and dry.   Neurological:      Mental Status: She is alert.   Psychiatric:         Mood and Affect: Mood normal.         Behavior: Behavior normal.         Thought Content: Thought content normal.         Judgment: Judgment normal.        Result Review :                Assessment and Plan   Diagnoses and all orders for this visit:    1. Acute maxillary sinusitis, recurrence not specified (Primary)    Other orders  -     ipratropium (ATROVENT) 0.03 % nasal spray; Administer 2 sprays into the nostril(s) as directed by provider Every 12 (Twelve) Hours.  Dispense: 30 mL; Refill: 2  -     amoxicillin-clavulanate (AUGMENTIN) 875-125 MG per tablet; Take 1 tablet by mouth 2 (Two) Times a Day. "  Dispense: 14 tablet; Refill: 0    Treating for sinus infection.  Start Atrovent for fluid behind the ear.  If no resolution follow-up as needed.  She verbalized understanding and is agreeable.         Follow Up   No follow-ups on file.  Patient was given instructions and counseling regarding her condition or for health maintenance advice. Please see specific information pulled into the AVS if appropriate.

## 2025-04-18 RX ORDER — TRIAMCINOLONE ACETONIDE 1 MG/G
1 OINTMENT TOPICAL 2 TIMES DAILY
Qty: 80 G | Refills: 0 | Status: SHIPPED | OUTPATIENT
Start: 2025-04-18

## 2025-07-15 ENCOUNTER — DOCUMENTATION (OUTPATIENT)
Dept: FAMILY MEDICINE CLINIC | Facility: CLINIC | Age: 40
End: 2025-07-15
Payer: COMMERCIAL

## 2025-07-15 RX ORDER — SCOPOLAMINE 1 MG/3D
1 PATCH, EXTENDED RELEASE TRANSDERMAL
Qty: 10 EACH | Refills: 0 | Status: SHIPPED | OUTPATIENT
Start: 2025-07-15

## 2025-07-28 ENCOUNTER — OFFICE VISIT (OUTPATIENT)
Dept: FAMILY MEDICINE CLINIC | Facility: CLINIC | Age: 40
End: 2025-07-28
Payer: COMMERCIAL

## 2025-07-28 VITALS
DIASTOLIC BLOOD PRESSURE: 68 MMHG | BODY MASS INDEX: 25.49 KG/M2 | HEIGHT: 65 IN | HEART RATE: 64 BPM | WEIGHT: 153 LBS | SYSTOLIC BLOOD PRESSURE: 92 MMHG | OXYGEN SATURATION: 96 %

## 2025-07-28 DIAGNOSIS — S39.012A STRAIN OF LUMBAR REGION, INITIAL ENCOUNTER: Primary | ICD-10-CM

## 2025-07-28 PROCEDURE — 96372 THER/PROPH/DIAG INJ SC/IM: CPT | Performed by: NURSE PRACTITIONER

## 2025-07-28 PROCEDURE — 99213 OFFICE O/P EST LOW 20 MIN: CPT | Performed by: NURSE PRACTITIONER

## 2025-07-28 RX ORDER — TRIAMCINOLONE ACETONIDE 40 MG/ML
40 INJECTION, SUSPENSION INTRA-ARTICULAR; INTRAMUSCULAR ONCE
Status: COMPLETED | OUTPATIENT
Start: 2025-07-28 | End: 2025-07-28

## 2025-07-28 RX ADMIN — TRIAMCINOLONE ACETONIDE 40 MG: 40 INJECTION, SUSPENSION INTRA-ARTICULAR; INTRAMUSCULAR at 10:22

## 2025-07-28 NOTE — PROGRESS NOTES
"Chief Complaint  Back Pain (Ongoing since last week. Low back pain that radiates to her hips. No urinary issues per pt. Pt said she was sleeping on a cot outside ands the next day her symptoms started. )    Subjective        Love Joya presents to [facility]  History of Present Illness      Patient presents today with complaint of back pain 3-4 days ago.  Had to sleep on cot and had pain that was worsening yesterday          Objective   Vital Signs:  BP 92/68   Pulse 64   Ht 165.1 cm (65\")   Wt 69.4 kg (153 lb)   SpO2 96%   BMI 25.46 kg/m²   Estimated body mass index is 25.46 kg/m² as calculated from the following:    Height as of this encounter: 165.1 cm (65\").    Weight as of this encounter: 69.4 kg (153 lb).          Physical Exam  Constitutional:       Appearance: Normal appearance.   Cardiovascular:      Rate and Rhythm: Normal rate and regular rhythm.      Pulses: Normal pulses.   Pulmonary:      Effort: Pulmonary effort is normal.      Breath sounds: Normal breath sounds.   Skin:     General: Skin is warm and dry.   Neurological:      Mental Status: She is alert.   Psychiatric:         Mood and Affect: Mood normal.         Behavior: Behavior normal.         Thought Content: Thought content normal.         Judgment: Judgment normal.        Result Review :                Assessment and Plan   Diagnoses and all orders for this visit:    1. Strain of lumbar region, initial encounter (Primary)  -     triamcinolone acetonide (KENALOG-40) injection 40 mg      Will give steroid for muscle strain.  She still has muscle relaxer as needed at home.    If no resolution follow up as needed.          Follow Up   No follow-ups on file.  Patient was given instructions and counseling regarding her condition or for health maintenance advice. Please see specific information pulled into the AVS if appropriate.             "